# Patient Record
Sex: FEMALE | Race: WHITE
[De-identification: names, ages, dates, MRNs, and addresses within clinical notes are randomized per-mention and may not be internally consistent; named-entity substitution may affect disease eponyms.]

---

## 2019-06-29 ENCOUNTER — HOSPITAL ENCOUNTER (EMERGENCY)
Dept: HOSPITAL 97 - ER | Age: 34
Discharge: HOME | End: 2019-06-29
Payer: COMMERCIAL

## 2019-06-29 VITALS — OXYGEN SATURATION: 100 %

## 2019-06-29 VITALS — SYSTOLIC BLOOD PRESSURE: 139 MMHG | DIASTOLIC BLOOD PRESSURE: 92 MMHG | TEMPERATURE: 98.2 F

## 2019-06-29 DIAGNOSIS — R00.2: Primary | ICD-10-CM

## 2019-06-29 LAB
ALBUMIN SERPL BCP-MCNC: 4 G/DL (ref 3.4–5)
ALP SERPL-CCNC: 56 U/L (ref 45–117)
ALT SERPL W P-5'-P-CCNC: 19 U/L (ref 12–78)
AST SERPL W P-5'-P-CCNC: 14 U/L (ref 15–37)
BUN BLD-MCNC: 9 MG/DL (ref 7–18)
GLUCOSE SERPLBLD-MCNC: 92 MG/DL (ref 74–106)
HCT VFR BLD CALC: 43.3 % (ref 36–45)
INR BLD: 1.04
LYMPHOCYTES # SPEC AUTO: 2.5 K/UL (ref 0.7–4.9)
MAGNESIUM SERPL-MCNC: 2.3 MG/DL (ref 1.8–2.4)
NT-PROBNP SERPL-MCNC: 46 PG/ML (ref ?–125)
PMV BLD: 9.4 FL (ref 7.6–11.3)
POTASSIUM SERPL-SCNC: 3.6 MMOL/L (ref 3.5–5.1)
RBC # BLD: 4.85 M/UL (ref 3.86–4.86)
TROPONIN (EMERG DEPT USE ONLY): < 0.02 NG/ML (ref 0–0.04)

## 2019-06-29 PROCEDURE — 80048 BASIC METABOLIC PNL TOTAL CA: CPT

## 2019-06-29 PROCEDURE — 85025 COMPLETE CBC W/AUTO DIFF WBC: CPT

## 2019-06-29 PROCEDURE — 93005 ELECTROCARDIOGRAM TRACING: CPT

## 2019-06-29 PROCEDURE — 71045 X-RAY EXAM CHEST 1 VIEW: CPT

## 2019-06-29 PROCEDURE — 81025 URINE PREGNANCY TEST: CPT

## 2019-06-29 PROCEDURE — 83735 ASSAY OF MAGNESIUM: CPT

## 2019-06-29 PROCEDURE — 85610 PROTHROMBIN TIME: CPT

## 2019-06-29 PROCEDURE — 84484 ASSAY OF TROPONIN QUANT: CPT

## 2019-06-29 PROCEDURE — 85379 FIBRIN DEGRADATION QUANT: CPT

## 2019-06-29 PROCEDURE — 83880 ASSAY OF NATRIURETIC PEPTIDE: CPT

## 2019-06-29 PROCEDURE — 80076 HEPATIC FUNCTION PANEL: CPT

## 2019-06-29 PROCEDURE — 81003 URINALYSIS AUTO W/O SCOPE: CPT

## 2019-06-29 PROCEDURE — 99284 EMERGENCY DEPT VISIT MOD MDM: CPT

## 2019-06-29 PROCEDURE — 36415 COLL VENOUS BLD VENIPUNCTURE: CPT

## 2019-06-29 NOTE — RAD REPORT
EXAM DESCRIPTION:  Nery Single View6/29/2019 11:24 am

 

CLINICAL HISTORY:  Chest pain

 

COMPARISON:  none

 

FINDINGS:   The lungs appear clear of acute infiltrate. The heart is normal size

 

IMPRESSION:   No acute abnormalities displayed

## 2019-06-29 NOTE — ER
Nurse's Notes                                                                                     

 [facility]                                                                 

Name: Rose Haile                                                                              

Age: 33 yrs                                                                                       

Sex: Female                                                                                       

: 1985                                                                                   

MRN: [id number]                                                                                   

Arrival Date: 2019                                                                          

Time: 11:00                                                                                       

Account#: [id number]                                                                            

Bed 14                                                                                            

Private MD: Jadon Gaines T                                                                        

Diagnosis: Palpitations                                                                           

                                                                                                  

Presentation:                                                                                     

                                                                                             

11:02 Presenting complaint: Patient states: "I think its been going on 2 weeks, when I lay    aj1 

      down at night my heart starts to race, then it feels like it does a real hard a couple      

      beats, and that goes on until I fall asleep, but its gone in the morning. Today its         

      still doing it this morning. Its not constant, but it keep going. And sometimes when I      

      take a deep breath it feels like I can't get all of my air in, and yesterday I had some     

      chest pain". Transition of care: patient was not received from another setting of care.     

      Onset of symptoms was 2019. Risk Assessment: Do you want to hurt yourself or           

      someone else? Patient reports no desire to harm self or others. Initial Sepsis Screen:      

      Does the patient meet any 2 criteria? No. Patient's initial sepsis screen is negative.      

      Does the patient have a suspected source of infection? No. Patient's initial sepsis         

      screen is negative. Care prior to arrival: None.                                            

11:02 Method Of Arrival: Ambulatory                                                           aj1 

11:02 Acuity: BARTOLOME 3                                                                           aj1 

                                                                                                  

Triage Assessment:                                                                                

11:04 General: Appears in no apparent distress. comfortable, Behavior is calm, cooperative,   aj1 

      appropriate for age. Pain: Denies pain. Neuro: Level of Consciousness is awake, alert,      

      obeys commands, Oriented to person, place, time, situation. Cardiovascular: Patient's       

      skin is warm and dry. Respiratory: Reports shortness of breath that is intermittent         

      Airway is patent Respiratory effort is even, unlabored, Respiratory pattern is regular,     

      symmetrical, Onset: The symptoms/episode began/occurred 2 weeks ago.                        

11:10 Respiratory: the patient has mild shortness of breath.                                  rb1 

                                                                                                  

OB/GYN:                                                                                           

11:04 LMP 2019                                                                           aj1 

                                                                                                  

Historical:                                                                                       

- Allergies:                                                                                      

11:04 No Known Allergies;                                                                     aj1 

- Home Meds:                                                                                      

11:04 None [Active];                                                                          aj1 

- PMHx:                                                                                           

11:04 None;                                                                                   aj1 

- PSHx:                                                                                           

11:04 back surgery;                                                                           aj1 

                                                                                                  

- Immunization history:: Flu vaccine is up to date.                                               

- Social history:: Smoking status: Patient/guardian denies using tobacco.                         

- Ebola Screening: : Patient denies travel to an Ebola-affected area in the 21 days               

  before illness onset.                                                                           

                                                                                                  

                                                                                                  

Screenin:10 Abuse screen: Denies threats or abuse. Nutritional screening: No deficits noted.        rb1 

      Tuberculosis screening: No symptoms or risk factors identified. Fall Risk None              

      identified.                                                                                 

                                                                                                  

Assessment:                                                                                       

11:10 General: Appears distressed, Behavior is anxious, crying, Denies fever. Pain: Denies    rb1 

      pain. Neuro: Level of Consciousness is awake, alert, obeys commands, Oriented to            

      person, place, time, situation. Cardiovascular: Rhythm is w/ PVC's. Respiratory:            

      Reports shortness of breath when she feels her heart fluttering. Airway is patent           

      Respiratory effort is even, unlabored, Respiratory pattern is regular, symmetrical,         

      Breath sounds are clear bilaterally. GI: No signs and/or symptoms were reported             

      involving the gastrointestinal system. : No signs and/or symptoms were reported           

      regarding the genitourinary system. Derm: Skin is pink, warm \T\ dry.                       

12:00 Reassessment: Patient appears in no apparent distress at this time. No changes from     rb1 

      previously documented assessment.  at beside.                                        

13:00 Reassessment: Patient appears in no apparent distress at this time. Patient and/or      rb1 

      family updated on plan of care and expected duration. Pain level reassessed. Patient is     

      alert, oriented x 3, equal unlabored respirations, skin warm/dry/pink. Pt. can still        

      feel her heart flutter when she has a PVC.                                                  

                                                                                                  

Vital Signs:                                                                                      

11:04  / 103; Pulse 84; Resp 18; Temp 98.3; Pulse Ox 100% on R/A; Weight 80.74 kg (R);  aj1 

      Height 5 ft. 6 in. (167.64 cm) (R); Pain 0/10;                                              

12:00  / 100; Pulse 76; Resp 17; Temp 98.2(O); Pulse Ox 99% on R/A; Pain 0/10;          rb1 

13:00  / 98; Pulse 77; Resp 17; Temp 98.3(O); Pulse Ox 100% on R/A; Pain 0/10;          rb1 

13:41  / 92; Pulse 75; Resp 17; Temp 98.2(O); Pulse Ox 100% on R/A; Pain 0/10;          rb1 

11:04 Body Mass Index 28.73 (80.74 kg, 167.64 cm)                                             aj1 

                                                                                                  

ED Course:                                                                                        

11:00 Patient arrived in ED.                                                                  ag5 

11:01 Sanya Delgado MD is Private Physician.                                                ag5 

11:01 Jadon Gaines MD is Private Physician.                                                   ag5 

11:04 Triage completed.                                                                       aj1 

11:04 Arm band placed on Patient placed in an exam room.                                      aj1 

11:06 [facility]                                                        kb  

11:06 Marlon Rose MD is Attending Physician.                                                kb  

11:10 Patient has correct armband on for positive identification. Placed in gown. Bed in low  rb1 

      position. Call light in reach. Side rails up X 1. Cardiac monitor on. Pulse ox on. NIBP     

      on. Warm blanket given.                                                                     

11:28 Inserted saline lock: 22 gauge in right antecubital area, using aseptic technique.      rb1 

11:29 XRAY Chest (1 view) In Process Unspecified.                                             EDMS

13:32 Mali Villarreal, RN is Primary Nurse.                                                   rb1 

13:56 No provider procedures requiring assistance completed. IV discontinued, intact,         em  

      bleeding controlled, No redness/swelling at site. Pressure dressing applied.                

                                                                                                  

Administered Medications:                                                                         

No medications were administered                                                                  

                                                                                                  

                                                                                                  

Outcome:                                                                                          

13:38 Discharge ordered by MD.                                                                kb  

13:56 Discharged to home ambulatory, with family.                                             em  

13:56 Condition: good                                                                             

13:56 Discharge instructions given to patient, family, Instructed on discharge instructions,      

      follow up and referral plans. Demonstrated understanding of instructions, follow-up         

      care.                                                                                       

13:57 Patient left the ED.                                                                    eb  

                                                                                                  

Signatures:                                                                                       

Dispatcher [employer]                           EDMS                                                 

Michelle Guevara FNP-C FNP-Ckb Johnson, Angela RN                     RN   aj1                                                  

Benji Hollingsworth, LVN                       LVN  em                                                   

Mali Villarreal, RN                     RN   rb1                                                  

Myrna Singh Ajare                                ag5                                                  

                                                                                                  

**************************************************************************************************

## 2019-06-29 NOTE — EDPHYS
Physician Documentation                                                                           

 [facility]                                                                 

Name: Rose Haile                                                                              

Age: 33 yrs                                                                                       

Sex: Female                                                                                       

: 1985                                                                                   

MRN: [id number]                                                                                   

Arrival Date: 2019                                                                          

Time: 11:00                                                                                       

Account#: [id number]                                                                            

Bed 14                                                                                            

Private MD: Jadon Gaines T                                                                        

ED Physician Marlon Rose                                                                         

HPI:                                                                                              

                                                                                             

11:43 This 33 yrs old  Female presents to ER via Ambulatory with complaints of       kb  

      Breathing Difficulty, Irregular Pulse.                                                      

11:43 The patient presents with a history of irregular heart beat, heart racing. Context: The kb  

      symptoms occur at rest. Onset: The symptoms/episode began/occurred 2 week(s) ago.           

      Duration: The patient or guardian reports multiple episodes. Modifying factors: The         

      symptoms are aggravated by lying down, The symptoms are alleviated by nothing.              

      Associated signs and symptoms: Pertinent positives: SOB. Severity of symptoms: At their     

      worst the symptoms were moderate in the emergency department the symptoms are               

      unchanged. The patient has not experienced similar symptoms in the past. The patient        

      has not recently seen a physician. Pt reports palpitations at night when she lays down      

      in bed for the past 2 weeks. States the palpitations are normally gone by morning, but      

      yesterday lasted for about an hour after waking with right sided chest pain. This           

      morning palpitations haven't gone away and she feels like she cannot take a deep breath.    

                                                                                                  

OB/GYN:                                                                                           

11:04 LMP 2019                                                                           aj1 

                                                                                                  

Historical:                                                                                       

- Allergies:                                                                                      

11:04 No Known Allergies;                                                                     aj1 

- Home Meds:                                                                                      

11:04 None [Active];                                                                          aj1 

- PMHx:                                                                                           

11:04 None;                                                                                   aj1 

- PSHx:                                                                                           

11:04 back surgery;                                                                           aj1 

                                                                                                  

- Immunization history:: Flu vaccine is up to date.                                               

- Social history:: Smoking status: Patient/guardian denies using tobacco.                         

- Ebola Screening: : Patient denies travel to an Ebola-affected area in the 21 days               

  before illness onset.                                                                           

                                                                                                  

                                                                                                  

ROS:                                                                                              

11:42 Constitutional: Negative for fever, chills, and weight loss, ENT: Negative for injury,  kb  

      pain, and discharge, Neck: Negative for injury, pain, and swelling, Abdomen/GI:             

      Negative for abdominal pain, nausea, vomiting, diarrhea, and constipation, Back:            

      Negative for injury and pain, : Negative for injury, bleeding, discharge, and             

      swelling, MS/Extremity: Negative for injury and deformity, Skin: Negative for injury,       

      rash, and discoloration, Neuro: Negative for headache, weakness, numbness, tingling,        

      and seizure.                                                                                

11:42 Cardiovascular: Positive for palpitations, Negative for chest pain, edema, orthopnea,       

      paroxysmal nocturnal dyspnea.                                                               

11:42 Respiratory: Positive for shortness of breath.                                              

                                                                                                  

Exam:                                                                                             

11:42 Constitutional:  This is a well developed, well nourished patient who is awake, alert,  kb  

      and in no acute distress. Head/Face:  Normocephalic, atraumatic. ENT:  Nares patent. No     

      nasal discharge, no septal abnormalities noted.  Tympanic membranes are normal and          

      external auditory canals are clear.  Oropharynx with no redness, swelling, or masses,       

      exudates, or evidence of obstruction, uvula midline.  Mucous membranes moist. Neck:         

      Trachea midline, no thyromegaly or masses palpated, and no cervical lymphadenopathy.        

      Supple, full range of motion without nuchal rigidity, or vertebral point tenderness.        

      No Meningismus. Chest/axilla:  Normal chest wall appearance and motion.  Nontender with     

      no deformity.  No lesions are appreciated. Cardiovascular:  Regular rate and rhythm         

      with a normal S1 and S2.  No gallops, murmurs, or rubs.  Normal PMI, no JVD.  No pulse      

      deficits. Respiratory:  Lungs have equal breath sounds bilaterally, clear to                

      auscultation and percussion.  No rales, rhonchi or wheezes noted.  No increased work of     

      breathing, no retractions or nasal flaring. Abdomen/GI:  Soft, non-tender, with normal      

      bowel sounds.  No distension or tympany.  No guarding or rebound.  No evidence of           

      tenderness throughout. Skin:  Warm, dry with normal turgor.  Normal color with no           

      rashes, no lesions, and no evidence of cellulitis. MS/ Extremity:  Pulses equal, no         

      cyanosis.  Neurovascular intact.  Full, normal range of motion. Neuro:  Awake and           

      alert, GCS 15, oriented to person, place, time, and situation.  Cranial nerves II-XII       

      grossly intact.  Motor strength 5/5 in all extremities.  Sensory grossly intact.            

      Cerebellar exam normal.  Normal gait.                                                       

                                                                                                  

Vital Signs:                                                                                      

11:04  / 103; Pulse 84; Resp 18; Temp 98.3; Pulse Ox 100% on R/A; Weight 80.74 kg (R);  aj1 

      Height 5 ft. 6 in. (167.64 cm) (R); Pain 0/10;                                              

12:00  / 100; Pulse 76; Resp 17; Temp 98.2(O); Pulse Ox 99% on R/A; Pain 0/10;          rb1 

13:00  / 98; Pulse 77; Resp 17; Temp 98.3(O); Pulse Ox 100% on R/A; Pain 0/10;          rb1 

13:41  / 92; Pulse 75; Resp 17; Temp 98.2(O); Pulse Ox 100% on R/A; Pain 0/10;          rb1 

11:04 Body Mass Index 28.73 (80.74 kg, 167.64 cm)                                             aj1 

                                                                                                  

MDM:                                                                                              

11:06 Patient medically screened.                                                             kb  

11:42 Data reviewed: vital signs, nurses notes. Data interpreted: Pulse oximetry: on room air kb  

      is 100 %. Interpretation: normal.                                                           

13:36 Counseling: I had a detailed discussion with the patient and/or guardian regarding: the kb  

      historical points, exam findings, and any diagnostic results supporting the                 

      discharge/admit diagnosis, lab results, radiology results, the need for outpatient          

      follow up, a cardiologist, to return to the emergency department if symptoms worsen or      

      persist or if there are any questions or concerns that arise at home. ED course: Dr Rose evaluated pt as well. Discussed need for follow up with cardiology for possible       

      holter monitor. Verbal understanding received. Pt will follow up and return for             

      worsening symptoms or other concerns. .                                                     

                                                                                                  

                                                                                             

11:07 Order name: Basic Metabolic Panel                                                         

                                                                                             

11:07 Order name: CBC with Diff; Complete Time: 12:05                                         kb  

                                                                                             

11:07 Order name: LFT's; Complete Time: 12:17                                                 kb  

                                                                                             

11:07 Order name: Magnesium; Complete Time: 12:17                                             kb  

                                                                                             

11:07 Order name: NT PRO-BNP; Complete Time: 12:17                                            kb  

                                                                                             

11:07 Order name: PT-INR; Complete Time: 12:05                                                kb  

                                                                                             

11:07 Order name: Troponin (emerg Dept Use Only); Complete Time: 12:17                        kb  

                                                                                             

11:07 Order name: XRAY Chest (1 view); Complete Time: 11:45                                   kb  

                                                                                             

11:07 Order name: EKG; Complete Time: 11:08                                                   kb  

                                                                                             

11:07 Order name: Cardiac monitoring; Complete Time: 11:10                                    kb  

                                                                                             

11:07 Order name: Basic Metabolic Panel; Complete Time: 12:17                                 EDMS

                                                                                             

11:13 Order name: D-Dimer; Complete Time: 12:28                                               kb  

                                                                                             

13:45 Order name: Urine Dipstick--Ancillary (enter results)                                     

                                                                                             

13:45 Order name: Urine Pregnancy--Ancillary (enter results)                                    

                                                                                             

11:07 Order name: EKG - Nurse/Tech; Complete Time: 11:43                                      kb  

                                                                                             

11:07 Order name: IV Saline Lock; Complete Time: 11:43                                        kb  

                                                                                             

11:07 Order name: Labs collected and sent; Complete Time: 11:43                               kb  

                                                                                             

11:07 Order name: O2 Per Protocol; Complete Time: 11:10                                       kb  

                                                                                             

11:07 Order name: O2 Sat Monitoring; Complete Time: 11:10                                     kb  

                                                                                                  

Administered Medications:                                                                         

No medications were administered                                                                  

                                                                                                  

                                                                                                  

Disposition:                                                                                      

16:52 Co-signature as Attending Physician, Marlon Rose MD.                                    rn  

                                                                                                  

Disposition:                                                                                      

19 13:38 Discharged to Home. Impression: Palpitations.                                      

- Condition is Stable.                                                                            

- Discharge Instructions: Palpitations, Easy-to-Read.                                             

                                                                                                  

- Medication Reconciliation Form, Thank You Letter, Antibiotic Education, Prescription            

  Opioid Use form.                                                                                

- Follow up: Emergency Department; When: As needed; Reason: Worsening of condition.               

  Follow up: Private Physician; When: 2 - 3 days; Reason: Recheck today's complaints,             

  Continuance of care, Re-evaluation by your physician.                                           

                                                                                                  

                                                                                                  

                                                                                                  

Signatures:                                                                                       

Dispatcher [employer]                           [facility]                                                 

Michelle Guevara, ANNIKA-EFRAÍN CHAVEZP-Bia Nelson RN                     RN   aj1                                                  

Marlon Rose MD MD rn Botello, Elizabeth eb                                                   

                                                                                                  

Corrections: (The following items were deleted from the chart)                                    

13:57 13:38 2019 13:38 Discharged to Home. Impression: Palpitations. Condition is       eb  

      Stable. Forms are Medication Reconciliation Form, Thank You Letter, Antibiotic              

      Education, Prescription Opioid Use. Follow up: Emergency Department; When: As needed;       

      Reason: Worsening of condition. Follow up: Private Physician; When: 2 - 3 days; Reason:     

      Recheck today's complaints, Continuance of care, Re-evaluation by your physician. kb        

                                                                                                  

**************************************************************************************************

## 2019-06-29 NOTE — EKG
Test Date:    2019-06-29               Test Time:    11:18:19

Technician:                                       

                                                     

MEASUREMENT RESULTS:                                       

Intervals:                                           

Rate:         86                                     

NJ:           162                                    

QRSD:         80                                     

QT:           384                                    

QTc:          459                                    

Axis:                                                

P:            65                                     

NJ:           162                                    

QRS:          10                                     

T:            47                                     

                                                     

INTERPRETIVE STATEMENTS:                                       

                                                     

Sinus rhythm with frequent premature ventricular complexes

Abnormal ECG

No previous ECG available for comparison



Electronically Signed On 06-29-19 18:41:10 CDT by Alex Castellanos